# Patient Record
Sex: MALE | Race: WHITE | NOT HISPANIC OR LATINO | Employment: FULL TIME | URBAN - METROPOLITAN AREA
[De-identification: names, ages, dates, MRNs, and addresses within clinical notes are randomized per-mention and may not be internally consistent; named-entity substitution may affect disease eponyms.]

---

## 2017-06-20 ENCOUNTER — OFFICE VISIT (OUTPATIENT)
Dept: SLEEP MEDICINE | Facility: CLINIC | Age: 62
End: 2017-06-20
Payer: COMMERCIAL

## 2017-06-20 VITALS
TEMPERATURE: 97.5 F | SYSTOLIC BLOOD PRESSURE: 142 MMHG | BODY MASS INDEX: 38.01 KG/M2 | HEIGHT: 68 IN | OXYGEN SATURATION: 98 % | DIASTOLIC BLOOD PRESSURE: 85 MMHG | HEART RATE: 68 BPM | WEIGHT: 250.8 LBS

## 2017-06-20 DIAGNOSIS — G47.33 OSA (OBSTRUCTIVE SLEEP APNEA): Primary | ICD-10-CM

## 2017-06-20 PROCEDURE — 99214 OFFICE O/P EST MOD 30 MIN: CPT | Performed by: PHYSICIAN ASSISTANT

## 2017-06-20 NOTE — MR AVS SNAPSHOT
After Visit Summary   6/20/2017    Meet Ruff    MRN: [id number]           Patient Information     Date Of Birth          1955        Visit Information        Provider Department      6/20/2017 3:30 PM Goltz, Bennett Ezra, PA-C [facility]        Today's Diagnoses     TOBY (obstructive sleep apnea)    -  1      Care Instructions      Your BMI is Body mass index is 38.13 kg/(m^2).  Weight management is a personal decision.  If you are interested in exploring weight loss strategies, the following discussion covers the approaches that may be successful. Body mass index (BMI) is one way to tell whether you are at a healthy weight, overweight, or obese. It measures your weight in relation to your height.  A BMI of 18.5 to 24.9 is in the healthy range. A person with a BMI of 25 to 29.9 is considered overweight, and someone with a BMI of 30 or greater is considered obese. More than two-thirds of American adults are considered overweight or obese.  Being overweight or obese increases the risk for further weight gain. Excess weight may lead to heart disease and diabetes.  Creating and following plans for healthy eating and physical activity may help you improve your health.  Weight control is part of healthy lifestyle and includes exercise, emotional health, and healthy eating habits. Careful eating habits lifelong are the mainstay of weight control. Though there are significant health benefits from weight loss, long-term weight loss with diet alone may be very difficult to achieve- studies show long-term success with dietary management in less than 10% of people. Attaining a healthy weight may be especially difficult to achieve in those with severe obesity. In some cases, medications, devices and surgical management might be considered.  What can you do?  If you are overweight or obese and are interested in methods for weight loss, you should discuss this with your provider.      Consider reducing daily calorie intake by 500 calories.     Keep a food journal.     Avoiding skipping meals, consider cutting portions instead.    Diet combined with exercise helps maintain muscle while optimizing fat loss. Strength training is particularly important for building and maintaining muscle mass. Exercise helps reduce stress, increase energy, and improves fitness. Increasing exercise without diet control, however, may not burn enough calories to loose weight.       Start walking three days a week 10-20 minutes at a time    Work towards walking thirty minutes five days a week     Eventually, increase the speed of your walking for 1-2 minutes at time    In addition, we recommend that you review healthy lifestyles and methods for weight loss available through the National Institutes of Health patient information sites:  http://win.niddk.nih.gov/publications/index.htm    And look into health and wellness programs that may be available through your health insurance provider, employer, local community center, or [employer] club.    Weight management plan: Patient was referred to their PCP to discuss a diet and exercise plan.              Follow-ups after your visit        Follow-up notes from your care team     Return in about 2 years (around 6/20/2019) for CPAP compliance recheck.      Who to contact     If you have questions or need follow up information about today's clinic visit or your schedule please contact [facility] directly at [phone].  Normal or non-critical lab and imaging results will be communicated to you by MyChart, letter or phone within 4 business days after the clinic has received the results. If you do not hear from us within 7 days, please contact the clinic through [employer] or phone. If you have a critical or abnormal lab result, we will notify you by phone as soon as possible.  Submit refill requests through [employer] or call your pharmacy and they will forward the  "refill request to us. Please allow 3 business days for your refill to be completed.          Additional Information About Your Visit        [employer] Information     [employer] gives you secure access to your electronic health record. If you see a primary care provider, you can also send messages to your care team and make appointments. If you have questions, please call your primary care clinic.  If you do not have a primary care provider, please call [phone] and they will assist you.        Care EveryWhere ID     This is your Care EveryWhere ID. This could be used by other organizations to access your [place] medical records  [id number]        Your Vitals Were     Pulse Temperature Height Pulse Oximetry BMI (Body Mass Index)       68 97.5  F (36.4  C) (Oral) 1.727 m (5' 8\") 98% 38.13 kg/m2        Blood Pressure from Last 3 Encounters:   06/20/17 142/85   06/27/16 130/80   08/23/15 165/80    Weight from Last 3 Encounters:   06/20/17 113.8 kg (250 lb 12.8 oz)   06/27/16 111.1 kg (245 lb)   08/10/15 111.1 kg (245 lb)              We Performed the Following     Comprehensive DME     Comprehensive DME        Primary Care Provider Office Phone # Fax #    Larry Hackett [phone]       [address] [place] [address] [place] [id number]        Thank you!     Thank you for choosing [facility]  for your care. Our goal is always to provide you with excellent care. Hearing back from our patients is one way we can continue to improve our services. Please take a few minutes to complete the written survey that you may receive in the mail after your visit with us. Thank you!             Your Updated Medication List - Protect others around you: Learn how to safely use, store and throw away your medicines at www.disposemymeds.org.          This list is accurate as of: 6/20/17  4:26 PM.  Always use your most recent med list.                   Brand Name Dispense Instructions " for use    oxyCODONE 5 MG IR tablet    ROXICODONE    40 tablet    Take 1-3 tablets (5-15 mg) by mouth every 4 hours as needed for pain       PROCTOZONE-HC 2.5 % cream   Generic drug:  hydrocortisone      APPLY TOPICALLY TO THE AFFECTED AREA TWICE DAILY       sildenafil 100 MG cap/tab    REVATIO/VIAGRA    4 tablet    Take 0.5-1 tablets ( mg) by mouth daily as needed for erectile dysfunction Take 30 min to 4 hours before intercourse.  Never use with nitroglycerin, terazosin or doxazosin.       simvastatin 40 MG tablet    ZOCOR    30 tablet    Take 1 tablet (40 mg) by mouth At Bedtime       vitamin D 2000 UNITS tablet      Take 1 tablet by mouth daily. 2 tablets daily

## 2017-06-20 NOTE — PROGRESS NOTES
Sleep Study Follow-Up Visit:    Date on this visit: 6/20/2017    Meet Ruff comes in today for follow-up of his treatment of TOBY. He was initially seen at the [facility] for previously diagnosed TOBY. He presents to update his CPAP equipment.   His sleep study occurred in [place] on 7/25/2006. His AHI was 38.7/hr.  He is on auto CPAP 5-15 cm. He is wondering about a CPAP for traveling.   He is not aware of snoring as long as the mask is fitting well. He uses an AirFit F10 full face mask. He likes the mask. He denies significant dry nose or mouth. He does not use the humidifier. He removes the humidifier.  He is due for a new mask and notices it is not sealing as well as is usually does. He is comfortable with the pressures.   The compliance data shows that he has used the CPAP for 30/30 nights, 100% of nights for >4 hours.  The 90th% pressure is 12.4 cm.  The average time in large leak is 2.5 min.  The average nightly usage is 7:45.  The average AHI is 2.6/hr. His leak often gets higher when the pressure gets close to the upper limit. It mostly increases for snoring.      Past medical/surgical history, family history, social history, medications and allergies were reviewed.      Problem List:  Patient Active Problem List    Diagnosis Date Noted     TOBY (obstructive sleep apnea) 10/06/2014     Priority: Medium     Family history of early CAD 07/10/2012     Priority: Medium     Body mass index 36.0-36.9, adult 07/10/2012     Priority: Medium     Hyperlipidemia LDL goal <130 09/14/2010     Priority: Medium     Vitamin D deficiency 07/13/2010     Priority: Medium     Problem list name updated by automated process. Provider to review       Rotator cuff tear 07/13/2010     Priority: Medium     left sided       Prediabetes 07/08/2010     Priority: Medium        Impression/Plan:  (G47.33) TOBY (obstructive sleep apnea)  (primary encounter diagnosis)  Comment: He is doing well with CPAP but  could use new supplies. He is interested in a travel CPAP.  Plan: Comprehensive DME, Comprehensive DME        I changed his pressures to 8-13 cm and showed him how to adjust the ramp. I gave him an order for a travel CPAP with the same pressure settings. We looked at new masks and supplies. He was shown where to get them online because he has a high deductible.      He will follow up with me in about 2 year(s).     Twenty-five minutes spent with patient, all of which were spent face-to-face counseling, consulting, coordinating plan of care.      Bennett Goltz, PA-C    CC: No ref. provider found

## 2017-06-20 NOTE — NURSING NOTE
"No chief complaint on file.      Initial /85  Pulse 68  Temp 97.5  F (36.4  C) (Oral)  Ht 1.727 m (5' 8\")  Wt 113.8 kg (250 lb 12.8 oz)  SpO2 98%  BMI 38.13 kg/m2 Estimated body mass index is 38.13 kg/(m^2) as calculated from the following:    Height as of this encounter: 1.727 m (5' 8\").    Weight as of this encounter: 113.8 kg (250 lb 12.8 oz).  Medication Reconciliation: complete   ESS 2/24  Aure Looney MA      "

## 2017-06-20 NOTE — PATIENT INSTRUCTIONS

## 2020-03-01 ENCOUNTER — HEALTH MAINTENANCE LETTER (OUTPATIENT)
Age: 65
End: 2020-03-01

## 2020-12-14 ENCOUNTER — HEALTH MAINTENANCE LETTER (OUTPATIENT)
Age: 65
End: 2020-12-14

## 2021-04-17 ENCOUNTER — HEALTH MAINTENANCE LETTER (OUTPATIENT)
Age: 66
End: 2021-04-17

## 2021-10-02 ENCOUNTER — HEALTH MAINTENANCE LETTER (OUTPATIENT)
Age: 66
End: 2021-10-02

## 2021-10-28 ENCOUNTER — TELEPHONE (OUTPATIENT)
Dept: SLEEP MEDICINE | Facility: CLINIC | Age: 66
End: 2021-10-28

## 2021-10-28 NOTE — TELEPHONE ENCOUNTER
Reason for call:  Other   Patient called regarding (reason for call): call back  Additional comments: Patient is requesting a call back to discuss CPAP. Patient CPAP is on the recall list would like to know if he should countie to use CPAP use ot if he should stop using CPAP. Patient is using CPAP at this time and would like to continue to still use CPAP      Phone number to reach patient:  Cell number on file:    Telephone Information:   Mobile [phone]       Best Time:  Anytime    Can we leave a detailed message on this number?  YES    Travel screening: Not Applicable

## 2021-11-04 NOTE — TELEPHONE ENCOUNTER
Patient call regarding [employer] recall for their pap device.    Device type:: Auto CPAP    Supplemental oxygen: No , if yes moved to advanced device workflow.     [facility]     Age of your current device:  greater than 5 years old    History review:     Does the patient have the following?      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 No       Discontinuation of PAP therapy would lead to substantial deterioration of functional status or quality of life. Yes    If yes to any of the questions      Advised patient to continue using therapy until device is replaced or repaired.    Advised patient to avoid unapproved cleaning methods, such as ozone (see FDA safety communication on use of ozone ).    Has patient registered device? Yes Advised patient to register for repair or replacement on the [employer] website.  Patient can call [employer] at [phone] for additional support.    Bacterial filters to reduce exposure to particulates are sometimes cumbersome to use and are not currently recommended by the .If you choose to use a bacterial filter, consider discontinuing using humidity with the filter.     Does the patient feel they still need to have further discussion with provider ?   No     Please contact your DME to see if you are eligible to receive a new device if it is over 5 years old.  You may also choose to pay out of pocket for a new device, if your insurance does not cover a new device at this  time.             Plan :     Patient wishes to continue therapy. Recommended  to continue use of therapy until it is repaired or replaced.

## 2022-05-08 ENCOUNTER — HEALTH MAINTENANCE LETTER (OUTPATIENT)
Age: 67
End: 2022-05-08

## 2022-09-26 ASSESSMENT — SLEEP AND FATIGUE QUESTIONNAIRES
HOW LIKELY ARE YOU TO NOD OFF OR FALL ASLEEP IN A CAR, WHILE STOPPED FOR A FEW MINUTES IN TRAFFIC: WOULD NEVER DOZE
HOW LIKELY ARE YOU TO NOD OFF OR FALL ASLEEP WHILE SITTING AND READING: SLIGHT CHANCE OF DOZING
HOW LIKELY ARE YOU TO NOD OFF OR FALL ASLEEP WHILE SITTING INACTIVE IN A PUBLIC PLACE: SLIGHT CHANCE OF DOZING
HOW LIKELY ARE YOU TO NOD OFF OR FALL ASLEEP WHILE SITTING QUIETLY AFTER LUNCH WITHOUT ALCOHOL: WOULD NEVER DOZE
HOW LIKELY ARE YOU TO NOD OFF OR FALL ASLEEP WHILE WATCHING TV: SLIGHT CHANCE OF DOZING
HOW LIKELY ARE YOU TO NOD OFF OR FALL ASLEEP WHILE SITTING AND TALKING TO SOMEONE: WOULD NEVER DOZE
HOW LIKELY ARE YOU TO NOD OFF OR FALL ASLEEP WHEN YOU ARE A PASSENGER IN A CAR FOR AN HOUR WITHOUT A BREAK: SLIGHT CHANCE OF DOZING
HOW LIKELY ARE YOU TO NOD OFF OR FALL ASLEEP WHILE LYING DOWN TO REST IN THE AFTERNOON WHEN CIRCUMSTANCES PERMIT: HIGH CHANCE OF DOZING
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
## 2022-09-28 ENCOUNTER — OFFICE VISIT (OUTPATIENT)
Dept: SLEEP MEDICINE | Facility: CLINIC | Age: 67
End: 2022-09-28
Payer: MEDICARE

## 2022-09-28 VITALS
DIASTOLIC BLOOD PRESSURE: 80 MMHG | WEIGHT: 249 LBS | HEIGHT: 68 IN | SYSTOLIC BLOOD PRESSURE: 129 MMHG | HEART RATE: 64 BPM | BODY MASS INDEX: 37.74 KG/M2 | OXYGEN SATURATION: 98 %

## 2022-09-28 DIAGNOSIS — G47.33 OSA (OBSTRUCTIVE SLEEP APNEA): Primary | ICD-10-CM

## 2022-09-28 PROCEDURE — 99204 OFFICE O/P NEW MOD 45 MIN: CPT | Performed by: PHYSICIAN ASSISTANT

## 2022-09-28 RX ORDER — METFORMIN HYDROCHLORIDE 750 MG/1
TABLET, EXTENDED RELEASE ORAL EVERY 12 HOURS
COMMUNITY
Start: 2022-08-15

## 2022-09-28 RX ORDER — ROSUVASTATIN CALCIUM 20 MG/1
TABLET, COATED ORAL EVERY 24 HOURS
COMMUNITY
Start: 2022-08-15

## 2022-09-28 RX ORDER — UBIDECARENONE 200 MG
CAPSULE ORAL 2 TIMES DAILY
COMMUNITY

## 2022-09-28 NOTE — PATIENT INSTRUCTIONS
Dreamstation 2 instructional video  [email or web address]     Your sleep apnea treatment may be affected by device recall    Our records show that you may have a Amor Respironics CPAP for the treatment of sleep apnea. Many of these devices have been recalled* by the  for replacement. [facility] recommends:     1) If you are using a Resmed device, continue using the device.  2) If you have a Maor Respironics device, register your device for confirmation of type of device and repair of the device at [email or web address] -if you cannot use link, call [phone].  The website will assist you in obtaining the serial number for registration.   3) If you are using a Amor Respironics CPAP or Bilevel PAP device and you do not have immediate breathing, driving or cardiovascular risks without the device, consider stopping use of the device after verification that is has been recalled. Discuss this decision with your medical provider if you are uncertain about your medical risks.  4) If you are not using Respironics CPAP but are using a Respironics advanced device for breathing support (AVAPS, ASV, Bilevel PAP), continue using the device and review 5 and 6 below).     5) If you continue the device, do not include ozone generating  connected to PAP devices.  6) Bacterial filters to reduce exposure to particulates are sometimes cumbersome to use and are not currently recommended by the .    ?       You may also choose discuss with your provider alternative approaches to treatment.      *[employer] is voluntarily recalling the below devices due to two (2) issues related to the polyester-based polyurethane (PE-PUR) sound abatement foam used in Amor Continuous and Non-Continuous Ventilators: 1) PE-PUR foam may degrade into particles which may enter the device's the air pathway and be ingested or inhaled by  the user, and 2) the PE-PUR foam may off-gas certain chemicals. The foam degradation may be exacerbated by use of unapproved cleaning methods, such as ozone (see FDA safety communication on use of Ozone ), and off-gassing may occur during initial operation and may possibly continue throughout the device's useful life.   These issues can result in serious injury which can be life-threatening, cause permanent impairment, and/or require medical intervention to preclude permanent impairment. To date, [employer] has received several complaints regarding the presence of black debris/particles within the airpath circuit (extending from the device outlet, humidifier, tubing, and mask). [employer] also has received reports of headache, upper airway irritation, cough, chest pressure and sinus infection. The potential risks of particulate exposure include: Irritation (skin, eye, and respiratory tract), inflammatory response, headache, asthma, adverse effects to other organs (e.g. kidneys and liver) and toxic carcinogenic affects. The potential risks of chemical exposure due to off-gassing include: headache/dizziness, irritation (eyes, nose, respiratory tract, skin), hypersensitivity, nausea/vomiting, toxic and carcinogenic effects. There have been no reports of death as a result of these issues.    Actions to be taken:  Discontinue the use of your device.  Do not continue to use ozone  with the device.     [place] affected devices on the recall website, www.[employer].Huaat/SRC-update    i. The website provides current information on the status of the recall and how  to receive permanent corrective action to address the two issues.    ii. The website also provides instructions on how to locate an affected device  Serial Number and will guide users through the registration process.    iii. In the , call [phone] Service Hotline if you cannot visit the website

## 2022-09-28 NOTE — NURSING NOTE
"Chief Complaint   Patient presents with     Sleep Problem     Re-establish care, New machine       Initial /80   Pulse 64   Ht 1.727 m (5' 8\")   Wt 112.9 kg (249 lb)   SpO2 98%   BMI 37.86 kg/m   Estimated body mass index is 37.86 kg/m  as calculated from the following:    Height as of this encounter: 1.727 m (5' 8\").    Weight as of this encounter: 112.9 kg (249 lb).    Medication Reconciliation: complete  ESS 7  Neck circumference: 50 centimeters.  Kayy Gonzalez MA  "

## 2023-01-14 ENCOUNTER — HEALTH MAINTENANCE LETTER (OUTPATIENT)
Age: 68
End: 2023-01-14

## 2023-04-23 ENCOUNTER — HEALTH MAINTENANCE LETTER (OUTPATIENT)
Age: 68
End: 2023-04-23

## 2023-06-08 ENCOUNTER — TELEPHONE (OUTPATIENT)
Dept: SLEEP MEDICINE | Facility: CLINIC | Age: 68
End: 2023-06-08
Payer: MEDICARE

## 2023-06-21 NOTE — TELEPHONE ENCOUNTER
Left follow up message letting patient know I didn't hear from him so I will assume he is no longer interested and will be removing him from the CPAP wait list.

## 2024-06-30 ENCOUNTER — HEALTH MAINTENANCE LETTER (OUTPATIENT)
Age: 69
End: 2024-06-30

## 2025-07-13 ENCOUNTER — HEALTH MAINTENANCE LETTER (OUTPATIENT)
Age: 70
End: 2025-07-13